# Patient Record
Sex: FEMALE | Race: WHITE | NOT HISPANIC OR LATINO | URBAN - METROPOLITAN AREA
[De-identification: names, ages, dates, MRNs, and addresses within clinical notes are randomized per-mention and may not be internally consistent; named-entity substitution may affect disease eponyms.]

---

## 2022-01-07 ENCOUNTER — TELEPHONE ENCOUNTER (OUTPATIENT)
Dept: URBAN - METROPOLITAN AREA CLINIC 78 | Facility: CLINIC | Age: 52
End: 2022-01-07

## 2022-01-28 ENCOUNTER — OFFICE VISIT (OUTPATIENT)
Dept: URBAN - METROPOLITAN AREA CLINIC 78 | Facility: CLINIC | Age: 52
End: 2022-01-28
Payer: COMMERCIAL

## 2022-01-28 VITALS
TEMPERATURE: 97.8 F | DIASTOLIC BLOOD PRESSURE: 69 MMHG | SYSTOLIC BLOOD PRESSURE: 118 MMHG | WEIGHT: 136.6 LBS | HEIGHT: 59 IN | RESPIRATION RATE: 16 BRPM | BODY MASS INDEX: 27.54 KG/M2 | HEART RATE: 79 BPM

## 2022-01-28 DIAGNOSIS — K76.0 FATTY (CHANGE OF) LIVER, NOT ELSEWHERE CLASSIFIED: ICD-10-CM

## 2022-01-28 DIAGNOSIS — R16.0 HEPATOMEGALY: ICD-10-CM

## 2022-01-28 DIAGNOSIS — R79.89 ABNORMAL LFTS: ICD-10-CM

## 2022-01-28 DIAGNOSIS — E13.9 DIABETES 1.5, MANAGED AS TYPE 2: ICD-10-CM

## 2022-01-28 PROCEDURE — 99243 OFF/OP CNSLTJ NEW/EST LOW 30: CPT | Performed by: INTERNAL MEDICINE

## 2022-01-28 PROCEDURE — 99203 OFFICE O/P NEW LOW 30 MIN: CPT | Performed by: INTERNAL MEDICINE

## 2022-01-28 PROCEDURE — 91200 LIVER ELASTOGRAPHY: CPT | Performed by: INTERNAL MEDICINE

## 2022-01-28 RX ORDER — BUPROPION HYDROCHLORIDE 450 MG/1
1 TABLET IN THE MORNING TABLET, FILM COATED, EXTENDED RELEASE ORAL ONCE A DAY
Status: ACTIVE | COMMUNITY

## 2022-01-28 RX ORDER — METFORMIN HYDROCHLORIDE 1000 MG/1
1 TABLET WITH A MEAL TABLET, FILM COATED ORAL ONCE A DAY
Status: ACTIVE | COMMUNITY

## 2022-01-28 RX ORDER — DAPAGLIFLOZIN 5 MG/1
1 TABLET TABLET, FILM COATED ORAL ONCE A DAY
Status: ACTIVE | COMMUNITY

## 2022-01-28 RX ORDER — PANTOPRAZOLE SODIUM 40 MG/1
1 TABLET TABLET, DELAYED RELEASE ORAL ONCE A DAY
Status: ACTIVE | COMMUNITY

## 2022-01-28 RX ORDER — SIMVASTATIN 40 MG/1
1 TABLET IN THE EVENING TABLET, FILM COATED ORAL ONCE A DAY
Status: ACTIVE | COMMUNITY

## 2022-01-28 RX ORDER — METOPROLOL TARTRATE 37.5 MG/1
1 TABLET WITH FOOD TABLET, FILM COATED ORAL TWICE A DAY
Status: ACTIVE | COMMUNITY

## 2022-01-28 RX ORDER — HYDROXYZINE PAMOATE 100 MG/1
AS DIRECTED CAPSULE ORAL
Status: ACTIVE | COMMUNITY

## 2022-01-28 RX ORDER — METHYLPHENIDATE HYDROCHLORIDE 36 MG/1
1 TABLET IN THE MORNING TABLET, FILM COATED, EXTENDED RELEASE ORAL ONCE A DAY
Status: ACTIVE | COMMUNITY

## 2022-01-28 RX ORDER — DULOXETINE HYDROCHLORIDE 60 MG/1
1 CAPSULE CAPSULE, DELAYED RELEASE ORAL ONCE A DAY
Status: ACTIVE | COMMUNITY

## 2022-01-28 NOTE — HPI-TODAY'S VISIT:
Patient was referred by Dr. Hull Gone  A copy of this document will be sent to the physician.  Patient see cardiologist Dr Chema Estrada ( for tachycardia)  and Psychiatrist Dr Chávez ( Anxiety and depression ) , Personal History of Diabetes on Metformin   Patient has enlarged fatty liver ( patient has a family history of enlargedliver or fatty liver)   Patient found out about fatty liver during w/u of elevated LFTs  ALT 49 AST 28 Glu 110   Hg A 1 c 7.2 %    May drink Etoh once a month or q 2 months  Denies weight gain   Last colonoscopy in Jan 2021

## 2022-02-10 LAB
ACTIN (SMOOTH MUSCLE) ANTIBODY: 4
AFP, SERUM, TUMOR MARKER: 1.4
ALPHA-1-ANTITRYPSIN, SERUM: 106
ANA DIRECT: NEGATIVE
CERULOPLASMIN: 24.9
CYTOMEGALOVIRUS (CMV) AB, IGM: <30
FERRITIN, SERUM: 8
GGT: 24
HEP A AB, TOTAL: NEGATIVE
HEP C VIRUS AB: <0.1
HEPATITIS B SURF AB QUANT: <3.1
IMMUNOGLOBULIN A, QN, SERUM: 129
IMMUNOGLOBULIN E, TOTAL: 6
IMMUNOGLOBULIN G, QN, SERUM: 729
IMMUNOGLOBULIN M, QN, SERUM: 82
IRON BIND.CAP.(TIBC): 405
IRON SATURATION: 12
IRON: 49
LIVER-KIDNEY MICROSOMAL AB: 0.7
MITOCHONDRIAL (M2) ANTIBODY: <20
PHENOTYPE (PI): (no result)
T-TRANSGLUTAMINASE (TTG) IGA: <2
UIBC: 356

## 2022-02-11 ENCOUNTER — WEB ENCOUNTER (OUTPATIENT)
Dept: URBAN - METROPOLITAN AREA CLINIC 78 | Facility: CLINIC | Age: 52
End: 2022-02-11

## 2022-02-11 ENCOUNTER — OFFICE VISIT (OUTPATIENT)
Dept: URBAN - METROPOLITAN AREA TELEHEALTH 2 | Facility: TELEHEALTH | Age: 52
End: 2022-02-11
Payer: COMMERCIAL

## 2022-02-11 ENCOUNTER — WEB ENCOUNTER (OUTPATIENT)
Dept: URBAN - METROPOLITAN AREA CLINIC 92 | Facility: CLINIC | Age: 52
End: 2022-02-11

## 2022-02-11 DIAGNOSIS — K76.0 FATTY (CHANGE OF) LIVER: ICD-10-CM

## 2022-02-11 DIAGNOSIS — E11.9 DIABETES: ICD-10-CM

## 2022-02-11 PROCEDURE — 97802 MEDICAL NUTRITION INDIV IN: CPT | Performed by: DIETITIAN, REGISTERED

## 2022-02-11 RX ORDER — DULOXETINE HYDROCHLORIDE 60 MG/1
1 CAPSULE CAPSULE, DELAYED RELEASE ORAL ONCE A DAY
Status: ACTIVE | COMMUNITY

## 2022-02-11 RX ORDER — METOPROLOL TARTRATE 37.5 MG/1
1 TABLET WITH FOOD TABLET, FILM COATED ORAL TWICE A DAY
Status: ACTIVE | COMMUNITY

## 2022-02-11 RX ORDER — BUPROPION HYDROCHLORIDE 450 MG/1
1 TABLET IN THE MORNING TABLET, FILM COATED, EXTENDED RELEASE ORAL ONCE A DAY
Status: ACTIVE | COMMUNITY

## 2022-02-11 RX ORDER — SIMVASTATIN 40 MG/1
1 TABLET IN THE EVENING TABLET, FILM COATED ORAL ONCE A DAY
Status: ACTIVE | COMMUNITY

## 2022-02-11 RX ORDER — METHYLPHENIDATE HYDROCHLORIDE 36 MG/1
1 TABLET IN THE MORNING TABLET, FILM COATED, EXTENDED RELEASE ORAL ONCE A DAY
Status: ACTIVE | COMMUNITY

## 2022-02-11 RX ORDER — PANTOPRAZOLE SODIUM 40 MG/1
1 TABLET TABLET, DELAYED RELEASE ORAL ONCE A DAY
Status: ACTIVE | COMMUNITY

## 2022-02-11 RX ORDER — HYDROXYZINE PAMOATE 100 MG/1
AS DIRECTED CAPSULE ORAL
Status: ACTIVE | COMMUNITY

## 2022-02-11 RX ORDER — METFORMIN HYDROCHLORIDE 1000 MG/1
1 TABLET WITH A MEAL TABLET, FILM COATED ORAL ONCE A DAY
Status: ACTIVE | COMMUNITY

## 2022-02-11 RX ORDER — DAPAGLIFLOZIN 5 MG/1
1 TABLET TABLET, FILM COATED ORAL ONCE A DAY
Status: ACTIVE | COMMUNITY

## 2022-02-18 ENCOUNTER — TELEPHONE ENCOUNTER (OUTPATIENT)
Dept: URBAN - METROPOLITAN AREA CLINIC 78 | Facility: CLINIC | Age: 52
End: 2022-02-18

## 2022-04-15 ENCOUNTER — OFFICE VISIT (OUTPATIENT)
Dept: URBAN - METROPOLITAN AREA CLINIC 78 | Facility: CLINIC | Age: 52
End: 2022-04-15

## 2022-04-15 ENCOUNTER — OFFICE VISIT (OUTPATIENT)
Dept: URBAN - METROPOLITAN AREA CLINIC 78 | Facility: CLINIC | Age: 52
End: 2022-04-15
Payer: COMMERCIAL

## 2022-04-15 VITALS
DIASTOLIC BLOOD PRESSURE: 75 MMHG | WEIGHT: 133.2 LBS | HEIGHT: 59 IN | TEMPERATURE: 98.4 F | SYSTOLIC BLOOD PRESSURE: 111 MMHG | RESPIRATION RATE: 16 BRPM | BODY MASS INDEX: 26.85 KG/M2 | HEART RATE: 80 BPM

## 2022-04-15 DIAGNOSIS — K21.9 GASTROESOPHAGEAL REFLUX DISEASE, UNSPECIFIED WHETHER ESOPHAGITIS PRESENT: ICD-10-CM

## 2022-04-15 DIAGNOSIS — K76.0 FATTY (CHANGE OF) LIVER, NOT ELSEWHERE CLASSIFIED: ICD-10-CM

## 2022-04-15 DIAGNOSIS — R79.0 LOW FERRITIN LEVEL: ICD-10-CM

## 2022-04-15 DIAGNOSIS — R79.89 ABNORMAL LFTS: ICD-10-CM

## 2022-04-15 DIAGNOSIS — R16.0 HEPATOMEGALY: ICD-10-CM

## 2022-04-15 DIAGNOSIS — E13.9 DIABETES 1.5, MANAGED AS TYPE 2: ICD-10-CM

## 2022-04-15 PROCEDURE — 99214 OFFICE O/P EST MOD 30 MIN: CPT | Performed by: INTERNAL MEDICINE

## 2022-04-15 RX ORDER — METHYLPHENIDATE HYDROCHLORIDE 36 MG/1
1 TABLET IN THE MORNING TABLET, FILM COATED, EXTENDED RELEASE ORAL ONCE A DAY
Status: ACTIVE | COMMUNITY

## 2022-04-15 RX ORDER — HYDROXYZINE PAMOATE 100 MG/1
AS DIRECTED CAPSULE ORAL
Status: ACTIVE | COMMUNITY

## 2022-04-15 RX ORDER — SIMVASTATIN 40 MG/1
1 TABLET IN THE EVENING TABLET, FILM COATED ORAL ONCE A DAY
Status: ACTIVE | COMMUNITY

## 2022-04-15 RX ORDER — BUPROPION HYDROCHLORIDE 450 MG/1
1 TABLET IN THE MORNING TABLET, FILM COATED, EXTENDED RELEASE ORAL ONCE A DAY
Status: ACTIVE | COMMUNITY

## 2022-04-15 RX ORDER — METFORMIN HYDROCHLORIDE 1000 MG/1
1 TABLET WITH A MEAL TABLET, FILM COATED ORAL ONCE A DAY
Status: ACTIVE | COMMUNITY

## 2022-04-15 RX ORDER — DULOXETINE HYDROCHLORIDE 60 MG/1
1 CAPSULE CAPSULE, DELAYED RELEASE ORAL ONCE A DAY
Status: ACTIVE | COMMUNITY

## 2022-04-15 RX ORDER — DAPAGLIFLOZIN 5 MG/1
1 TABLET TABLET, FILM COATED ORAL ONCE A DAY
Status: ACTIVE | COMMUNITY

## 2022-04-15 RX ORDER — METOPROLOL TARTRATE 37.5 MG/1
1 TABLET WITH FOOD TABLET, FILM COATED ORAL TWICE A DAY
Status: ACTIVE | COMMUNITY

## 2022-04-15 RX ORDER — PANTOPRAZOLE SODIUM 40 MG/1
1 TABLET TABLET, DELAYED RELEASE ORAL ONCE A DAY
Status: ACTIVE | COMMUNITY

## 2022-04-15 NOTE — HPI-TODAY'S VISIT:
Patient is seen in follow up  She had colonoscopy in Jan 2021 routinely , reportedly,  normal  (will bring records)  Taking Advil if necessary  Denies rectal bleeding, hematemesis, dysphagia   Longstanding reflux ( had scope in high school )  She is on PPI   She has seen Dr José Miguel Banks   She had expanded labs for elevated LFTS and fibroscan which are reviewed     PREVIOUS ENCOUNTER:    Patient see cardiologist Dr Chema Estrada (for tachycardia)  and Psychiatrist Dr Chávez (Anxiety and depression) , Personal History of Diabetes on Metformin   Patient has enlarged fatty liver (patient has a family history of enlarged liver or fatty liver)   Patient found out about fatty liver during w/u of elevated LFTs  ALT 49 AST 28 Glu 110   Hg A 1 c 7.2 %    May drink Etoh once a month or q 2 months  Denies weight gain   Last colonoscopy in Jan 2021

## 2022-04-17 ENCOUNTER — TELEPHONE ENCOUNTER (OUTPATIENT)
Dept: URBAN - METROPOLITAN AREA CLINIC 78 | Facility: CLINIC | Age: 52
End: 2022-04-17

## 2022-04-17 PROBLEM — 426875007: Status: ACTIVE | Noted: 2022-01-28

## 2022-04-22 ENCOUNTER — WEB ENCOUNTER (OUTPATIENT)
Dept: URBAN - METROPOLITAN AREA CLINIC 78 | Facility: CLINIC | Age: 52
End: 2022-04-22

## 2022-04-22 RX ORDER — SODIUM SULFATE, MAGNESIUM SULFATE, AND POTASSIUM CHLORIDE 17.75; 2.7; 2.25 G/1; G/1; G/1
12 TABLETS TABLET ORAL
Qty: 24 TABLETS | Refills: 0 | OUTPATIENT
Start: 2022-04-25 | End: 2022-04-26

## 2022-04-22 RX ORDER — SIMVASTATIN 40 MG/1
1 TABLET IN THE EVENING TABLET, FILM COATED ORAL ONCE A DAY
Status: ACTIVE | COMMUNITY

## 2022-04-22 RX ORDER — FERRIC CARBOXYMALTOSE INJECTION 50 MG/ML
AS DIRECTED INJECTION, SOLUTION INTRAVENOUS
Qty: 2 VIAL | Refills: 0 | OUTPATIENT
Start: 2022-04-25 | End: 2022-05-09

## 2022-04-22 RX ORDER — DULOXETINE HYDROCHLORIDE 60 MG/1
1 CAPSULE CAPSULE, DELAYED RELEASE ORAL ONCE A DAY
Status: ACTIVE | COMMUNITY

## 2022-04-22 RX ORDER — CETIRIZINE HYDROCHLORIDE 10 MG/1
1 CAPSULE TABLET, FILM COATED ORAL ONCE A DAY
Qty: 1 CAPSULE | Refills: 0 | OUTPATIENT
Start: 2022-04-25 | End: 2022-04-26

## 2022-04-22 RX ORDER — DAPAGLIFLOZIN 5 MG/1
1 TABLET TABLET, FILM COATED ORAL ONCE A DAY
Status: ACTIVE | COMMUNITY

## 2022-04-22 RX ORDER — METOPROLOL TARTRATE 37.5 MG/1
1 TABLET WITH FOOD TABLET, FILM COATED ORAL TWICE A DAY
Status: ACTIVE | COMMUNITY

## 2022-04-22 RX ORDER — PANTOPRAZOLE SODIUM 40 MG/1
1 TABLET TABLET, DELAYED RELEASE ORAL ONCE A DAY
Status: ACTIVE | COMMUNITY

## 2022-04-22 RX ORDER — METHYLPHENIDATE HYDROCHLORIDE 36 MG/1
1 TABLET IN THE MORNING TABLET, FILM COATED, EXTENDED RELEASE ORAL ONCE A DAY
Status: ACTIVE | COMMUNITY

## 2022-04-22 RX ORDER — METFORMIN HYDROCHLORIDE 1000 MG/1
1 TABLET WITH A MEAL TABLET, FILM COATED ORAL ONCE A DAY
Status: ACTIVE | COMMUNITY

## 2022-04-22 RX ORDER — HYDROXYZINE PAMOATE 100 MG/1
AS DIRECTED CAPSULE ORAL
Status: ACTIVE | COMMUNITY

## 2022-04-22 RX ORDER — BUPROPION HYDROCHLORIDE 450 MG/1
1 TABLET IN THE MORNING TABLET, FILM COATED, EXTENDED RELEASE ORAL ONCE A DAY
Status: ACTIVE | COMMUNITY

## 2022-04-22 RX ORDER — ACETAMINOPHEN 650 MG
2 TABLETS AS NEEDED TABLET, EXTENDED RELEASE ORAL
Qty: 6 TABLET | Refills: 0 | OUTPATIENT
Start: 2022-04-25 | End: 2022-04-26

## 2022-04-25 ENCOUNTER — WEB ENCOUNTER (OUTPATIENT)
Dept: URBAN - METROPOLITAN AREA CLINIC 78 | Facility: CLINIC | Age: 52
End: 2022-04-25

## 2022-04-30 LAB
BASO (ABSOLUTE): 0
BASOS: 1
EOS (ABSOLUTE): 0.1
EOS: 1
HEMATOCRIT: 41.3
HEMATOLOGY COMMENTS:: (no result)
HEMOGLOBIN: 11.8
IMMATURE CELLS: (no result)
IMMATURE GRANS (ABS): 0.1
IMMATURE GRANULOCYTES: 1
LYMPHS (ABSOLUTE): 2.1
LYMPHS: 26
MCH: 20.6
MCHC: 28.6
MCV: 72
MONOCYTES(ABSOLUTE): 0.8
MONOCYTES: 9
NEUTROPHILS (ABSOLUTE): 5.3
NEUTROPHILS: 62
NRBC: (no result)
PLATELETS: 375
RBC: 5.73
RDW: 19.2
WBC: 8.3

## 2022-05-02 ENCOUNTER — TELEPHONE ENCOUNTER (OUTPATIENT)
Dept: URBAN - METROPOLITAN AREA CLINIC 78 | Facility: CLINIC | Age: 52
End: 2022-05-02

## 2022-05-03 ENCOUNTER — WEB ENCOUNTER (OUTPATIENT)
Dept: URBAN - METROPOLITAN AREA CLINIC 78 | Facility: CLINIC | Age: 52
End: 2022-05-03

## 2022-05-19 ENCOUNTER — WEB ENCOUNTER (OUTPATIENT)
Dept: URBAN - METROPOLITAN AREA CLINIC 78 | Facility: CLINIC | Age: 52
End: 2022-05-19

## 2022-06-16 ENCOUNTER — TELEPHONE ENCOUNTER (OUTPATIENT)
Dept: URBAN - METROPOLITAN AREA SURGERY CENTER 30 | Facility: SURGERY CENTER | Age: 52
End: 2022-06-16

## 2022-06-24 ENCOUNTER — CLAIMS CREATED FROM THE CLAIM WINDOW (OUTPATIENT)
Dept: URBAN - METROPOLITAN AREA CLINIC 4 | Facility: CLINIC | Age: 52
End: 2022-06-24
Payer: COMMERCIAL

## 2022-06-24 ENCOUNTER — OFFICE VISIT (OUTPATIENT)
Dept: URBAN - METROPOLITAN AREA SURGERY CENTER 15 | Facility: SURGERY CENTER | Age: 52
End: 2022-06-24
Payer: COMMERCIAL

## 2022-06-24 DIAGNOSIS — K31.7 POLYP OF STOMACH AND DUODENUM: ICD-10-CM

## 2022-06-24 DIAGNOSIS — D50.0 ANEMIA: ICD-10-CM

## 2022-06-24 DIAGNOSIS — K21.9 GASTRO-ESOPHAGEAL REFLUX DISEASE WITHOUT ESOPHAGITIS: ICD-10-CM

## 2022-06-24 DIAGNOSIS — K21.9 ACID REFLUX: ICD-10-CM

## 2022-06-24 DIAGNOSIS — K31.89 OTHER DISEASES OF STOMACH AND DUODENUM: ICD-10-CM

## 2022-06-24 DIAGNOSIS — K31.7 BENIGN GASTRIC POLYP: ICD-10-CM

## 2022-06-24 PROCEDURE — G8907 PT DOC NO EVENTS ON DISCHARG: HCPCS | Performed by: INTERNAL MEDICINE

## 2022-06-24 PROCEDURE — 43239 EGD BIOPSY SINGLE/MULTIPLE: CPT | Performed by: INTERNAL MEDICINE

## 2022-06-24 PROCEDURE — 45378 DIAGNOSTIC COLONOSCOPY: CPT | Performed by: INTERNAL MEDICINE

## 2022-06-24 PROCEDURE — 88305 TISSUE EXAM BY PATHOLOGIST: CPT | Performed by: PATHOLOGY

## 2022-06-24 PROCEDURE — 88312 SPECIAL STAINS GROUP 1: CPT | Performed by: PATHOLOGY

## 2022-06-24 PROCEDURE — 43251 EGD REMOVE LESION SNARE: CPT | Performed by: INTERNAL MEDICINE

## 2022-07-06 ENCOUNTER — WEB ENCOUNTER (OUTPATIENT)
Dept: URBAN - METROPOLITAN AREA CLINIC 78 | Facility: CLINIC | Age: 52
End: 2022-07-06

## 2022-07-07 ENCOUNTER — WEB ENCOUNTER (OUTPATIENT)
Dept: URBAN - METROPOLITAN AREA CLINIC 78 | Facility: CLINIC | Age: 52
End: 2022-07-07

## 2022-08-01 ENCOUNTER — TELEPHONE ENCOUNTER (OUTPATIENT)
Dept: URBAN - METROPOLITAN AREA CLINIC 78 | Facility: CLINIC | Age: 52
End: 2022-08-01

## 2022-08-01 ENCOUNTER — OFFICE VISIT (OUTPATIENT)
Dept: URBAN - METROPOLITAN AREA CLINIC 78 | Facility: CLINIC | Age: 52
End: 2022-08-01
Payer: COMMERCIAL

## 2022-08-01 VITALS
HEART RATE: 86 BPM | HEIGHT: 59 IN | TEMPERATURE: 98.4 F | DIASTOLIC BLOOD PRESSURE: 66 MMHG | WEIGHT: 137.4 LBS | SYSTOLIC BLOOD PRESSURE: 100 MMHG | RESPIRATION RATE: 16 BRPM | BODY MASS INDEX: 27.7 KG/M2

## 2022-08-01 DIAGNOSIS — R16.0 HEPATOMEGALY: ICD-10-CM

## 2022-08-01 DIAGNOSIS — K21.9 GASTROESOPHAGEAL REFLUX DISEASE, UNSPECIFIED WHETHER ESOPHAGITIS PRESENT: ICD-10-CM

## 2022-08-01 DIAGNOSIS — R79.0 LOW FERRITIN LEVEL: ICD-10-CM

## 2022-08-01 DIAGNOSIS — K76.0 FATTY (CHANGE OF) LIVER, NOT ELSEWHERE CLASSIFIED: ICD-10-CM

## 2022-08-01 PROBLEM — 235595009: Status: ACTIVE | Noted: 2022-04-15

## 2022-08-01 PROBLEM — 197321007: Status: ACTIVE | Noted: 2022-01-28

## 2022-08-01 PROCEDURE — 99214 OFFICE O/P EST MOD 30 MIN: CPT | Performed by: INTERNAL MEDICINE

## 2022-08-01 RX ORDER — SIMVASTATIN 40 MG/1
1 TABLET IN THE EVENING TABLET, FILM COATED ORAL ONCE A DAY
Status: ACTIVE | COMMUNITY

## 2022-08-01 RX ORDER — DAPAGLIFLOZIN 5 MG/1
1 TABLET TABLET, FILM COATED ORAL ONCE A DAY
Status: ACTIVE | COMMUNITY

## 2022-08-01 RX ORDER — BUPROPION HYDROCHLORIDE 450 MG/1
1 TABLET IN THE MORNING TABLET, FILM COATED, EXTENDED RELEASE ORAL ONCE A DAY
Status: ACTIVE | COMMUNITY

## 2022-08-01 RX ORDER — HYDROXYZINE PAMOATE 100 MG/1
AS DIRECTED CAPSULE ORAL
Status: ACTIVE | COMMUNITY

## 2022-08-01 RX ORDER — METHYLPHENIDATE HYDROCHLORIDE 36 MG/1
1 TABLET IN THE MORNING TABLET, FILM COATED, EXTENDED RELEASE ORAL ONCE A DAY
Status: ACTIVE | COMMUNITY

## 2022-08-01 RX ORDER — DULOXETINE HYDROCHLORIDE 60 MG/1
1 CAPSULE CAPSULE, DELAYED RELEASE ORAL ONCE A DAY
Status: ACTIVE | COMMUNITY

## 2022-08-01 RX ORDER — PANTOPRAZOLE SODIUM 40 MG/1
1 TABLET TABLET, DELAYED RELEASE ORAL ONCE A DAY
Status: ACTIVE | COMMUNITY

## 2022-08-01 RX ORDER — ONDANSETRON HYDROCHLORIDE 4 MG/1
1 TABLET TABLET, FILM COATED ORAL ONCE A DAY
Qty: 1 TABLET | Refills: 0 | OUTPATIENT
Start: 2022-08-01

## 2022-08-01 RX ORDER — METOPROLOL TARTRATE 37.5 MG/1
1 TABLET WITH FOOD TABLET, FILM COATED ORAL TWICE A DAY
Status: ACTIVE | COMMUNITY

## 2022-08-01 RX ORDER — HYDROCORTISONE SODIUM SUCCINATE 100 MG/2ML
AS DIRECTED INJECTION, POWDER, FOR SOLUTION INTRAMUSCULAR; INTRAVENOUS
Qty: 100 MILLIGRAM | Refills: 0 | OUTPATIENT
Start: 2022-08-01 | End: 2022-08-02

## 2022-08-01 RX ORDER — METFORMIN HYDROCHLORIDE 1000 MG/1
1 TABLET WITH A MEAL TABLET, FILM COATED ORAL ONCE A DAY
Status: ACTIVE | COMMUNITY

## 2022-08-01 RX ORDER — DIPHENHYDRAMINE HCL 2 %
1 CAPSULE AT BEDTIME AS NEEDED CREAM (GRAM) TOPICAL ONCE A DAY
Qty: 30 CAPSULE | Refills: 0 | OUTPATIENT
Start: 2022-08-01 | End: 2022-08-31

## 2022-08-01 NOTE — HPI-TODAY'S VISIT:
I reviewed the EGD and colonoscopy findings and prep with patient . Reviewed the path removed including path results  All questions answered  to satisfaction  EGD with biopsy : Fundic gland polyp  HP neg  CD neg   RUQ elastography :reviewed  MRI done by PCP : AMI  reportedly normal   Ferritin is 9  and repeat labs via PCP still revealed low iron as per patient  Does not tolerate oral iron    Does not have her cycles , follows up with her Gynae

## 2022-08-24 ENCOUNTER — WEB ENCOUNTER (OUTPATIENT)
Dept: URBAN - METROPOLITAN AREA CLINIC 78 | Facility: CLINIC | Age: 52
End: 2022-08-24

## 2022-08-25 ENCOUNTER — WEB ENCOUNTER (OUTPATIENT)
Dept: URBAN - METROPOLITAN AREA CLINIC 78 | Facility: CLINIC | Age: 52
End: 2022-08-25

## 2022-08-26 ENCOUNTER — CLAIMS CREATED FROM THE CLAIM WINDOW (OUTPATIENT)
Dept: URBAN - METROPOLITAN AREA CLINIC 77 | Facility: CLINIC | Age: 52
End: 2022-08-26

## 2022-08-26 ENCOUNTER — CLAIMS CREATED FROM THE CLAIM WINDOW (OUTPATIENT)
Dept: URBAN - METROPOLITAN AREA CLINIC 77 | Facility: CLINIC | Age: 52
End: 2022-08-26
Payer: COMMERCIAL

## 2022-08-26 ENCOUNTER — WEB ENCOUNTER (OUTPATIENT)
Dept: URBAN - METROPOLITAN AREA CLINIC 77 | Facility: CLINIC | Age: 52
End: 2022-08-26

## 2022-08-26 ENCOUNTER — OFFICE VISIT (OUTPATIENT)
Dept: URBAN - METROPOLITAN AREA CLINIC 77 | Facility: CLINIC | Age: 52
End: 2022-08-26

## 2022-08-26 DIAGNOSIS — K92.2 ACUTE GASTROINTESTINAL BLEEDING: ICD-10-CM

## 2022-08-26 PROCEDURE — 91110 GI TRC IMG INTRAL ESOPH-ILE: CPT | Performed by: INTERNAL MEDICINE

## 2022-08-26 RX ORDER — DAPAGLIFLOZIN 5 MG/1
1 TABLET TABLET, FILM COATED ORAL ONCE A DAY
Status: ACTIVE | COMMUNITY

## 2022-08-26 RX ORDER — DIPHENHYDRAMINE HCL 2 %
1 CAPSULE AT BEDTIME AS NEEDED CREAM (GRAM) TOPICAL ONCE A DAY
Qty: 30 CAPSULE | Refills: 0 | Status: ACTIVE | COMMUNITY
Start: 2022-08-01 | End: 2022-08-31

## 2022-08-26 RX ORDER — HYDROXYZINE PAMOATE 100 MG/1
AS DIRECTED CAPSULE ORAL
Status: ACTIVE | COMMUNITY

## 2022-08-26 RX ORDER — SIMVASTATIN 40 MG/1
1 TABLET IN THE EVENING TABLET, FILM COATED ORAL ONCE A DAY
Status: ACTIVE | COMMUNITY

## 2022-08-26 RX ORDER — METHYLPHENIDATE HYDROCHLORIDE 36 MG/1
1 TABLET IN THE MORNING TABLET, FILM COATED, EXTENDED RELEASE ORAL ONCE A DAY
Status: ACTIVE | COMMUNITY

## 2022-08-26 RX ORDER — BUPROPION HYDROCHLORIDE 450 MG/1
1 TABLET IN THE MORNING TABLET, FILM COATED, EXTENDED RELEASE ORAL ONCE A DAY
Status: ACTIVE | COMMUNITY

## 2022-08-26 RX ORDER — DULOXETINE HYDROCHLORIDE 60 MG/1
1 CAPSULE CAPSULE, DELAYED RELEASE ORAL ONCE A DAY
Status: ACTIVE | COMMUNITY

## 2022-08-26 RX ORDER — ONDANSETRON HYDROCHLORIDE 4 MG/1
1 TABLET TABLET, FILM COATED ORAL ONCE A DAY
Qty: 1 TABLET | Refills: 0 | Status: ACTIVE | COMMUNITY
Start: 2022-08-01

## 2022-08-26 RX ORDER — PANTOPRAZOLE SODIUM 40 MG/1
1 TABLET TABLET, DELAYED RELEASE ORAL ONCE A DAY
Status: ACTIVE | COMMUNITY

## 2022-08-26 RX ORDER — METFORMIN HYDROCHLORIDE 1000 MG/1
1 TABLET WITH A MEAL TABLET, FILM COATED ORAL ONCE A DAY
Status: ACTIVE | COMMUNITY

## 2022-08-26 RX ORDER — METOPROLOL TARTRATE 37.5 MG/1
1 TABLET WITH FOOD TABLET, FILM COATED ORAL TWICE A DAY
Status: ACTIVE | COMMUNITY

## 2022-08-29 ENCOUNTER — TELEPHONE ENCOUNTER (OUTPATIENT)
Dept: URBAN - METROPOLITAN AREA CLINIC 78 | Facility: CLINIC | Age: 52
End: 2022-08-29

## 2022-09-13 ENCOUNTER — WEB ENCOUNTER (OUTPATIENT)
Dept: URBAN - METROPOLITAN AREA CLINIC 78 | Facility: CLINIC | Age: 52
End: 2022-09-13

## 2022-09-22 ENCOUNTER — WEB ENCOUNTER (OUTPATIENT)
Dept: URBAN - METROPOLITAN AREA CLINIC 78 | Facility: CLINIC | Age: 52
End: 2022-09-22

## 2022-09-22 RX ORDER — HYDROCORTISONE SODIUM SUCCINATE 100 MG/2ML
AS DIRECTED INJECTION, POWDER, FOR SOLUTION INTRAMUSCULAR; INTRAVENOUS
Qty: 100 MILLIGRAM | Refills: 0 | OUTPATIENT
Start: 2022-10-13 | End: 2022-10-14

## 2022-09-22 RX ORDER — IRON SUCROSE 20 MG/ML
AS DIRECTED INJECTION, SOLUTION INTRAVENOUS
Qty: 5 VIAL | Refills: 0 | OUTPATIENT
Start: 2022-10-13 | End: 2022-10-27

## 2022-09-22 RX ORDER — CETIRIZINE HYDROCHLORIDE 10 MG/1
1 CAPSULE TABLET, FILM COATED ORAL ONCE A DAY
Qty: 1 CAPSULE | Refills: 0 | OUTPATIENT
Start: 2022-10-13 | End: 2022-10-14

## 2022-09-22 RX ORDER — ONDANSETRON HYDROCHLORIDE 4 MG/1
1 TABLET, FILM COATED ORAL ONCE
Qty: 1 | Refills: 0 | OUTPATIENT
Start: 2022-10-13

## 2022-10-14 ENCOUNTER — TELEPHONE ENCOUNTER (OUTPATIENT)
Dept: URBAN - METROPOLITAN AREA CLINIC 77 | Facility: CLINIC | Age: 52
End: 2022-10-14

## 2022-11-01 ENCOUNTER — WEB ENCOUNTER (OUTPATIENT)
Dept: URBAN - METROPOLITAN AREA CLINIC 78 | Facility: CLINIC | Age: 52
End: 2022-11-01

## 2023-05-24 ENCOUNTER — TELEPHONE ENCOUNTER (OUTPATIENT)
Dept: URBAN - METROPOLITAN AREA CLINIC 78 | Facility: CLINIC | Age: 53
End: 2023-05-24

## 2023-05-31 ENCOUNTER — DASHBOARD ENCOUNTERS (OUTPATIENT)
Age: 53
End: 2023-05-31

## 2023-06-02 ENCOUNTER — OFFICE VISIT (OUTPATIENT)
Dept: URBAN - METROPOLITAN AREA CLINIC 78 | Facility: CLINIC | Age: 53
End: 2023-06-02
Payer: COMMERCIAL

## 2023-06-02 VITALS
WEIGHT: 147.2 LBS | HEIGHT: 59 IN | HEART RATE: 72 BPM | SYSTOLIC BLOOD PRESSURE: 109 MMHG | RESPIRATION RATE: 16 BRPM | DIASTOLIC BLOOD PRESSURE: 66 MMHG | TEMPERATURE: 98.1 F | BODY MASS INDEX: 29.68 KG/M2

## 2023-06-02 DIAGNOSIS — R19.7 DIARRHEA: ICD-10-CM

## 2023-06-02 DIAGNOSIS — R16.0 HEPATOMEGALY: ICD-10-CM

## 2023-06-02 DIAGNOSIS — K62.5: ICD-10-CM

## 2023-06-02 DIAGNOSIS — K21.9 GASTROESOPHAGEAL REFLUX DISEASE, UNSPECIFIED WHETHER ESOPHAGITIS PRESENT: ICD-10-CM

## 2023-06-02 DIAGNOSIS — K76.0 FATTY (CHANGE OF) LIVER, NOT ELSEWHERE CLASSIFIED: ICD-10-CM

## 2023-06-02 PROCEDURE — 99214 OFFICE O/P EST MOD 30 MIN: CPT | Performed by: INTERNAL MEDICINE

## 2023-06-02 RX ORDER — PANTOPRAZOLE SODIUM 40 MG/1
1 TABLET TABLET, DELAYED RELEASE ORAL ONCE A DAY
Status: ACTIVE | COMMUNITY

## 2023-06-02 RX ORDER — DAPAGLIFLOZIN 5 MG/1
1 TABLET TABLET, FILM COATED ORAL ONCE A DAY
Status: ACTIVE | COMMUNITY

## 2023-06-02 RX ORDER — METHYLPHENIDATE HYDROCHLORIDE 36 MG/1
1 TABLET IN THE MORNING TABLET, FILM COATED, EXTENDED RELEASE ORAL ONCE A DAY
Status: ACTIVE | COMMUNITY

## 2023-06-02 RX ORDER — DULOXETINE HYDROCHLORIDE 60 MG/1
1 CAPSULE CAPSULE, DELAYED RELEASE ORAL ONCE A DAY
Status: ACTIVE | COMMUNITY

## 2023-06-02 RX ORDER — ONDANSETRON HYDROCHLORIDE 4 MG/1
1 TABLET, FILM COATED ORAL ONCE
Qty: 1 | Refills: 0 | Status: ON HOLD | COMMUNITY
Start: 2022-10-13

## 2023-06-02 RX ORDER — ONDANSETRON HYDROCHLORIDE 4 MG/1
1 TABLET TABLET, FILM COATED ORAL ONCE A DAY
Qty: 1 TABLET | Refills: 0 | Status: ACTIVE | COMMUNITY
Start: 2022-08-01

## 2023-06-02 RX ORDER — METOPROLOL TARTRATE 37.5 MG/1
1 TABLET WITH FOOD TABLET, FILM COATED ORAL TWICE A DAY
Status: ACTIVE | COMMUNITY

## 2023-06-02 RX ORDER — SIMVASTATIN 40 MG/1
1 TABLET IN THE EVENING TABLET, FILM COATED ORAL ONCE A DAY
Status: ACTIVE | COMMUNITY

## 2023-06-02 RX ORDER — METFORMIN HYDROCHLORIDE 1000 MG/1
1 TABLET WITH A MEAL TABLET, FILM COATED ORAL ONCE A DAY
Status: ACTIVE | COMMUNITY

## 2023-06-02 RX ORDER — HYDROXYZINE PAMOATE 100 MG/1
AS DIRECTED CAPSULE ORAL
Status: ACTIVE | COMMUNITY

## 2023-06-02 RX ORDER — BUPROPION HYDROCHLORIDE 450 MG/1
1 TABLET IN THE MORNING TABLET, FILM COATED, EXTENDED RELEASE ORAL ONCE A DAY
Status: ACTIVE | COMMUNITY

## 2023-06-02 NOTE — HPI-TODAY'S VISIT:
Patient was referred by Dr. Hull Gone  A copy of this document will be sent to the physician.  Patient known to me.  She is presenting for change in bowel habits primarily diarrhea onset last Wednesday greater than a week ago.  In the beginning the frequency was multiple times a day now she may get once or every other day loose stools.  She has been on brat diet.  She is also reporting rectal bleeding described as bright red blood per rectum in the toilet.  She has a history of iron deficiency anemia with a work-up in 2022 including a capsule study.  She has had no labs done through PCP.   Denies travel  Some sick contacts  Denies abx use  Busiparone was added  Mimvey ( post menopausal HRT)  Diabetes control not sure  Cardiac evaluation is normal ( Dr Estrada ) ..saw him past spring  2023  Anxiety is imroved ( Dr Chávez )  Patient has enlarged fatty liver ( patient has a family history of enlargedliver or fatty liver) 7.2 %    May drink Etoh once a month or q 2 months  Denies weight gain   Last colonoscopy in Jan 2022

## 2023-06-03 LAB
A/G RATIO: 2.3
ALBUMIN: 4.5
ALKALINE PHOSPHATASE: 55
ALT (SGPT): 30
AST (SGOT): 23
BASO (ABSOLUTE): 0.1
BASOS: 1
BILIRUBIN, TOTAL: 0.2
BUN/CREATININE RATIO: 10
BUN: 8
CALCIUM: 9.5
CARBON DIOXIDE, TOTAL: 20
CHLORIDE: 101
CREATININE: 0.83
EGFR: 84
EOS (ABSOLUTE): 0
EOS: 0
FERRITIN, SERUM: 96
GLOBULIN, TOTAL: 2
GLUCOSE: 173
HEMATOCRIT: 44.3
HEMATOLOGY COMMENTS:: (no result)
HEMOGLOBIN: 14
IMMATURE CELLS: (no result)
IMMATURE GRANS (ABS): 0.2
IMMATURE GRANULOCYTES: 2
LYMPHS (ABSOLUTE): 1.9
LYMPHS: 19
MCH: 25.4
MCHC: 31.6
MCV: 80
MONOCYTES(ABSOLUTE): 0.7
MONOCYTES: 7
NEUTROPHILS (ABSOLUTE): 7.1
NEUTROPHILS: 71
NRBC: (no result)
PLATELETS: 328
POTASSIUM: 3.7
PROTEIN, TOTAL: 6.5
RBC: 5.51
RDW: 14.7
SODIUM: 140
WBC: 10

## 2023-06-16 ENCOUNTER — WEB ENCOUNTER (OUTPATIENT)
Dept: URBAN - METROPOLITAN AREA CLINIC 78 | Facility: CLINIC | Age: 53
End: 2023-06-16

## 2023-06-16 LAB
C DIFFICILE TOXINS A+B, EIA: NEGATIVE
CALPROTECTIN, FECAL: 670

## 2023-06-20 ENCOUNTER — WEB ENCOUNTER (OUTPATIENT)
Dept: URBAN - METROPOLITAN AREA CLINIC 78 | Facility: CLINIC | Age: 53
End: 2023-06-20

## 2023-06-22 ENCOUNTER — WEB ENCOUNTER (OUTPATIENT)
Dept: URBAN - METROPOLITAN AREA CLINIC 78 | Facility: CLINIC | Age: 53
End: 2023-06-22